# Patient Record
Sex: FEMALE | Race: WHITE | NOT HISPANIC OR LATINO | URBAN - METROPOLITAN AREA
[De-identification: names, ages, dates, MRNs, and addresses within clinical notes are randomized per-mention and may not be internally consistent; named-entity substitution may affect disease eponyms.]

---

## 2018-03-15 ENCOUNTER — APPOINTMENT (RX ONLY)
Dept: URBAN - METROPOLITAN AREA CLINIC 127 | Facility: CLINIC | Age: 53
Setting detail: DERMATOLOGY
End: 2018-03-15

## 2018-03-15 DIAGNOSIS — Z41.9 ENCOUNTER FOR PROCEDURE FOR PURPOSES OTHER THAN REMEDYING HEALTH STATE, UNSPECIFIED: ICD-10-CM

## 2018-03-15 DIAGNOSIS — D22 MELANOCYTIC NEVI: ICD-10-CM

## 2018-03-15 PROBLEM — E78.5 HYPERLIPIDEMIA, UNSPECIFIED: Status: ACTIVE | Noted: 2018-03-15

## 2018-03-15 PROBLEM — M12.9 ARTHROPATHY, UNSPECIFIED: Status: ACTIVE | Noted: 2018-03-15

## 2018-03-15 PROBLEM — D22.61 MELANOCYTIC NEVI OF RIGHT UPPER LIMB, INCLUDING SHOULDER: Status: ACTIVE | Noted: 2018-03-15

## 2018-03-15 PROCEDURE — 99202 OFFICE O/P NEW SF 15 MIN: CPT | Mod: 25

## 2018-03-15 PROCEDURE — ? COSMETIC CONSULTATION: FILLERS

## 2018-03-15 PROCEDURE — 11301 SHAVE SKIN LESION 0.6-1.0 CM: CPT

## 2018-03-15 PROCEDURE — ? OTHER

## 2018-03-15 PROCEDURE — ? SHAVE REMOVAL

## 2018-03-15 ASSESSMENT — LOCATION SIMPLE DESCRIPTION DERM: LOCATION SIMPLE: RIGHT UPPER ARM

## 2018-03-15 ASSESSMENT — LOCATION DETAILED DESCRIPTION DERM: LOCATION DETAILED: RIGHT ANTERIOR PROXIMAL UPPER ARM

## 2018-03-15 ASSESSMENT — LOCATION ZONE DERM: LOCATION ZONE: ARM

## 2018-03-15 NOTE — PROCEDURE: OTHER
Other (Free Text): Discussed how pt could be a good candidate for sculptra \\nRecommended 2 vials for 1st tx, 2 vials for second tx, and 1-2 vials for 3rd tx.\\nExplained how filler will last around 2 years. Discussed how she would see more improvement with sculptra than ultherapy
Detail Level: Simple
Note Text (......Xxx Chief Complaint.): This diagnosis correlates with the acne

## 2018-03-15 NOTE — PROCEDURE: SHAVE REMOVAL
Anesthesia Volume In Cc: 1
Size Of Lesion In Cm (Required): 0.8
Wound Care: Biafine
Size Of Margin In Cm (Margins Are Not Added To Billing Dimensions): -
X Size Of Lesion In Cm (Optional): 0
Anesthesia Type: 1% lidocaine with epinephrine
Bill For Surgical Tray: no
Notification Instructions: Patient will be notified of biopsy results. However, patient instructed to call the office if not contacted within 2 weeks.
Medical Necessity Clause: This procedure was medically necessary because the lesion that was treated was:
Post-Care Instructions: I reviewed with the patient in detail post-care instructions. Patient is to keep the biopsy site dry overnight, and then apply bacitracin twice daily until healed. Patient may apply hydrogen peroxide soaks to remove any crusting.
Render Post-Care Instructions In Note?: yes
Consent was obtained from the patient. The risks and benefits to therapy were discussed in detail. Specifically, the risks of infection, scarring, bleeding, prolonged wound healing, incomplete removal, allergy to anesthesia, nerve injury and recurrence were addressed. Prior to the procedure, the treatment site was clearly identified and confirmed by the patient. All components of Universal Protocol/PAUSE Rule completed.\\nVerbal consent given.
Hemostasis: Electrocautery
Billing Type: Third-Party Bill
Detail Level: Detailed
Medical Necessity Information: It is in your best interest to select a reason for this procedure from the list below. All of these items fulfill various CMS LCD requirements except the new and changing color options.
Biopsy Method: Dermablade